# Patient Record
Sex: FEMALE | Race: BLACK OR AFRICAN AMERICAN | NOT HISPANIC OR LATINO | URBAN - METROPOLITAN AREA
[De-identification: names, ages, dates, MRNs, and addresses within clinical notes are randomized per-mention and may not be internally consistent; named-entity substitution may affect disease eponyms.]

---

## 2018-08-06 ENCOUNTER — TESTING ONLY (OUTPATIENT)
Dept: URBAN - METROPOLITAN AREA CLINIC 193 | Facility: CLINIC | Age: 78
End: 2018-08-06
Payer: COMMERCIAL

## 2018-08-06 PROCEDURE — 92083 EXTENDED VISUAL FIELD XM: CPT | Performed by: OPTOMETRIST

## 2018-08-09 ENCOUNTER — OFFICE VISIT (OUTPATIENT)
Dept: URBAN - METROPOLITAN AREA CLINIC 193 | Facility: CLINIC | Age: 78
End: 2018-08-09
Payer: COMMERCIAL

## 2018-08-09 PROCEDURE — 92133 CPTRZD OPH DX IMG PST SGM ON: CPT | Performed by: OPHTHALMOLOGY

## 2018-08-09 PROCEDURE — 92014 COMPRE OPH EXAM EST PT 1/>: CPT | Performed by: OPHTHALMOLOGY

## 2018-08-09 RX ORDER — BIMATOPROST 0.1 MG/ML
0.01 % SOLUTION/ DROPS OPHTHALMIC
Qty: 7.5 | Refills: 3 | Status: INACTIVE
Start: 2018-08-09 | End: 2018-11-27

## 2018-08-09 ASSESSMENT — INTRAOCULAR PRESSURE
OS: 14
OD: 14

## 2018-08-09 NOTE — IMPRESSION/PLAN
Impression: Diagnosis: Age-related nuclear cataract, bilateral. Code: H25.13.  Plan: Continue to monitor

## 2018-08-09 NOTE — IMPRESSION/PLAN
Impression: Diagnosis: Anatomical narrow angle, bilateral. Code: H40.033. early PAS.  S/P PI OU Plan: Continue to monitor

## 2018-08-09 NOTE — IMPRESSION/PLAN
Impression: Diagnosis: Primary open-angle glaucoma, bilateral, moderate stage. Code: Z33.4050. OU. S/P SLT OU (03/28/2013) (01/26/17) IOP OU controlled on current regimen. No changes needed. OCT stable compared to last. VF stable compared to last. Plan: Continue Lumigan QHS OU and Dorzolamide BID OU.

## 2018-11-29 ENCOUNTER — OFFICE VISIT (OUTPATIENT)
Dept: URBAN - METROPOLITAN AREA CLINIC 71 | Facility: CLINIC | Age: 78
End: 2018-11-29
Payer: COMMERCIAL

## 2018-11-29 DIAGNOSIS — H25.13 AGE-RELATED NUCLEAR CATARACT, BILATERAL: ICD-10-CM

## 2018-11-29 PROCEDURE — 99213 OFFICE O/P EST LOW 20 MIN: CPT | Performed by: OPHTHALMOLOGY

## 2018-11-29 RX ORDER — DORZOLAMIDE HYDROCHLORIDE AND TIMOLOL MALEATE 20; 5 MG/ML; MG/ML
SOLUTION/ DROPS OPHTHALMIC
Qty: 3 | Refills: 3 | Status: INACTIVE
Start: 2018-11-29 | End: 2019-03-21

## 2018-11-29 ASSESSMENT — INTRAOCULAR PRESSURE
OD: 15
OS: 15

## 2018-11-29 NOTE — IMPRESSION/PLAN
Impression: Diagnosis: Primary open-angle glaucoma, bilateral, moderate stage. Code: N50.9416. OU. S/P SLT OU (03/28/2013) (01/26/17) IOP OU controlled on current regimen. No changes needed. Plan: Continue Lumigan QHS OU and Dorzolamide BID OU.

## 2019-03-21 ENCOUNTER — OFFICE VISIT (OUTPATIENT)
Dept: URBAN - METROPOLITAN AREA CLINIC 71 | Facility: CLINIC | Age: 79
End: 2019-03-21
Payer: COMMERCIAL

## 2019-03-21 PROCEDURE — 99213 OFFICE O/P EST LOW 20 MIN: CPT | Performed by: OPHTHALMOLOGY

## 2019-03-21 RX ORDER — DORZOLAMIDE HYDROCHLORIDE AND TIMOLOL MALEATE 20; 5 MG/ML; MG/ML
SOLUTION/ DROPS OPHTHALMIC
Qty: 3 | Refills: 3 | Status: INACTIVE
Start: 2019-03-21 | End: 2019-04-04

## 2019-03-21 RX ORDER — BIMATOPROST 0.1 MG/ML
0.01 % SOLUTION/ DROPS OPHTHALMIC
Qty: 2.5 | Refills: 6 | Status: INACTIVE
Start: 2019-03-21 | End: 2019-08-12

## 2019-03-21 ASSESSMENT — INTRAOCULAR PRESSURE
OD: 14
OS: 14

## 2019-03-21 NOTE — IMPRESSION/PLAN
Impression: Diagnosis: Primary open-angle glaucoma, bilateral, moderate stage. Code: Q37.9700. OU. S/P SLT OU (03/28/2013) (01/26/17) IOP OU controlled on current regimen. No changes needed. Plan: Continue Lumigan QHS OU and Dorzolamide BID OU. Needs updated tests.

## 2019-07-17 ENCOUNTER — Encounter (OUTPATIENT)
Dept: URBAN - METROPOLITAN AREA CLINIC 71 | Facility: CLINIC | Age: 79
End: 2019-07-17
Payer: COMMERCIAL

## 2019-07-17 PROCEDURE — 92083 EXTENDED VISUAL FIELD XM: CPT | Performed by: OPHTHALMOLOGY

## 2019-07-31 ENCOUNTER — OFFICE VISIT (OUTPATIENT)
Dept: URBAN - METROPOLITAN AREA CLINIC 71 | Facility: CLINIC | Age: 79
End: 2019-07-31
Payer: MEDICARE

## 2019-07-31 PROCEDURE — 92014 COMPRE OPH EXAM EST PT 1/>: CPT | Performed by: OPHTHALMOLOGY

## 2019-07-31 PROCEDURE — 92133 CPTRZD OPH DX IMG PST SGM ON: CPT | Performed by: OPHTHALMOLOGY

## 2019-07-31 ASSESSMENT — INTRAOCULAR PRESSURE
OD: 15
OS: 15

## 2019-07-31 NOTE — IMPRESSION/PLAN
Impression: Diagnosis: Primary open-angle glaucoma, bilateral, moderate stage. Code: Q46.7905. OU. S/P SLT OU (03/28/2013) (01/26/17) IOP OU controlled on current regimen. No changes needed. OCT stable compared to last.  VF stable compared to last. Plan: Continue Lumigan QHS OU and Dorzolamide BID OU.

## 2019-10-31 ENCOUNTER — OFFICE VISIT (OUTPATIENT)
Dept: URBAN - METROPOLITAN AREA CLINIC 71 | Facility: CLINIC | Age: 79
End: 2019-10-31
Payer: MEDICARE

## 2019-10-31 PROCEDURE — 99213 OFFICE O/P EST LOW 20 MIN: CPT | Performed by: OPHTHALMOLOGY

## 2019-10-31 ASSESSMENT — INTRAOCULAR PRESSURE
OD: 16
OS: 15

## 2019-10-31 NOTE — IMPRESSION/PLAN
Impression: Diagnosis: Primary open-angle glaucoma, bilateral, moderate stage. Code: Z64.5814. OU. S/P SLT OU (03/28/2013) (01/26/17) IOP OU controlled on current regimen. No changes needed. Plan: Continue Lumigan QHS OU and Dorzolamide BID OU. Discussed with patient, will transfer care to Dr Carlo Muller or patient can travel to Dr Geronimo Fierro in [place].

## 2020-02-10 ENCOUNTER — OFFICE VISIT (OUTPATIENT)
Dept: URBAN - METROPOLITAN AREA CLINIC 71 | Facility: CLINIC | Age: 80
End: 2020-02-10
Payer: MEDICARE

## 2020-02-10 PROCEDURE — 99213 OFFICE O/P EST LOW 20 MIN: CPT | Performed by: OPTOMETRIST

## 2020-02-10 ASSESSMENT — INTRAOCULAR PRESSURE
OD: 16
OS: 14

## 2020-02-10 NOTE — IMPRESSION/PLAN
Impression: Diagnosis: Primary open-angle glaucoma, bilateral, moderate stage. Code: F66.7418. OU. S/P SLT OU (03/28/2013) (01/26/17) IOP OU controlled on current regimen. No changes needed. Plan: Continue Lumigan QHS OU and Dorzolamide BID OU. Dowd: will transfer care to Dr Laura Manjarrez or patient can travel to Dr Katarzyna Butler in [place] if glc not controlled. testing due 7/2020.

## 2020-05-11 ENCOUNTER — OFFICE VISIT (OUTPATIENT)
Dept: URBAN - METROPOLITAN AREA CLINIC 71 | Facility: CLINIC | Age: 80
End: 2020-05-11
Payer: MEDICARE

## 2020-05-11 PROCEDURE — 99213 OFFICE O/P EST LOW 20 MIN: CPT | Performed by: OPTOMETRIST

## 2020-05-11 ASSESSMENT — INTRAOCULAR PRESSURE
OD: 15
OS: 13

## 2020-05-11 NOTE — IMPRESSION/PLAN
Impression: Diagnosis: Primary open-angle glaucoma, bilateral, moderate stage. Code: Z56.8796. OU. S/P SLT OU (03/28/2013) (01/26/17) IOP OU controlled on current regimen. No changes Plan: Continue Lumigan QHS OU and Dorzolamide BID OU. Dowd: will transfer care to Dr Brenda Herrmann or patient can travel to Dr Misti Gonzalez in [place] if glc not controlled. testing due 7/2020.

## 2020-05-11 NOTE — IMPRESSION/PLAN
Impression: Diagnosis: Age-related nuclear cataract, bilateral. Code: H25.13. happy with vision as is Plan: Continue to monitor

## 2020-09-17 ENCOUNTER — OFFICE VISIT (OUTPATIENT)
Dept: URBAN - METROPOLITAN AREA CLINIC 71 | Facility: CLINIC | Age: 80
End: 2020-09-17
Payer: MEDICARE

## 2020-09-17 DIAGNOSIS — H25.813 COMBINED FORMS OF AGE-RELATED CATARACT, BILATERAL: ICD-10-CM

## 2020-09-17 PROCEDURE — 92083 EXTENDED VISUAL FIELD XM: CPT | Performed by: OPTOMETRIST

## 2020-09-17 PROCEDURE — 92133 CPTRZD OPH DX IMG PST SGM ON: CPT | Performed by: OPTOMETRIST

## 2020-09-17 PROCEDURE — 92014 COMPRE OPH EXAM EST PT 1/>: CPT | Performed by: OPTOMETRIST

## 2020-09-17 ASSESSMENT — KERATOMETRY
OS: 44.75
OD: 44.88

## 2020-09-17 ASSESSMENT — INTRAOCULAR PRESSURE
OS: 15
OD: 15

## 2020-09-17 NOTE — IMPRESSION/PLAN
Impression: Combined forms of age-related cataract, bilateral: H25.813. vision stable OU. Happy with vision OU. Plan: Cataracts affecting vision some, but no surgery is currently recommended. The patient will monitor vision changes and contact us with any decrease in vision. Continue to monitor.

## 2020-09-17 NOTE — IMPRESSION/PLAN
Impression: Diagnosis: Primary open-angle glaucoma, bilateral, moderate stage. Code: N48.3279. OU. S/P SLT OU (03/28/2013) (01/26/17). IOP OU controlled on current regimen. OCT 9/17/20: mildly worse OU. VF 9/17/20: general reduction and glc maybe worse OD, fluctuating OS. Plan: Continue Lumigan QHS OU and Dorzolamide BID OU. Due to seemingly controlled IOPs but OCT/VF showing possible progression OU, refer to Dr. Nelson Ruiz for glc consult next available.

## 2020-12-22 ENCOUNTER — OFFICE VISIT (OUTPATIENT)
Dept: URBAN - METROPOLITAN AREA CLINIC 75 | Facility: CLINIC | Age: 80
End: 2020-12-22
Payer: MEDICARE

## 2020-12-22 DIAGNOSIS — H40.1132 PRIMARY OPEN-ANGLE GLAUCOMA, BILATERAL, MODERATE STAGE: Primary | ICD-10-CM

## 2020-12-22 PROCEDURE — 99213 OFFICE O/P EST LOW 20 MIN: CPT | Performed by: OPTOMETRIST

## 2020-12-22 ASSESSMENT — INTRAOCULAR PRESSURE
OD: 13
OS: 13

## 2020-12-22 NOTE — IMPRESSION/PLAN
Impression: Diagnosis: Primary open-angle glaucoma, bilateral, moderate stage. Code: Y58.8537. OU.  IOP OU controlled on current regimen. Plan: Continue Lumigan QHS OU and Dorzolamide BID OU. Due to seemingly controlled IOPs but OCT/VF showing possible progression OU, refer to ophthalmologist for SLT OU. Pt had good outcome from past SLT.

## 2020-12-22 NOTE — IMPRESSION/PLAN
Impression: Anatomical narrow angle, bilateral: H40.033. Plan: Discussed s/s of angle closure. Observe.

## 2021-03-09 ENCOUNTER — PRE-OPERATIVE VISIT (OUTPATIENT)
Dept: URBAN - METROPOLITAN AREA CLINIC 71 | Facility: CLINIC | Age: 81
End: 2021-03-09
Payer: MEDICARE

## 2021-03-09 DIAGNOSIS — H40.1111 PRIMARY OPEN-ANGLE GLAUCOMA, RIGHT EYE, MILD STAGE: Primary | ICD-10-CM

## 2021-03-09 DIAGNOSIS — H40.1121 PRIMARY OPEN-ANGLE GLAUCOMA, LEFT EYE, MILD STAGE: ICD-10-CM

## 2021-03-09 PROCEDURE — 99213 OFFICE O/P EST LOW 20 MIN: CPT | Performed by: OPHTHALMOLOGY

## 2021-03-09 NOTE — IMPRESSION/PLAN
Impression: Primary open-angle glaucoma, left eye, mild stage: H40.1121. Plan: OU: Discussed diagnosis in detail with patient. Discussed treatment options with patient. Advised patient of condition. Surgical treatment is necessary to improve pressures. Patient elects to have surgery. Surgical risks and benefits were discussed, explained and understood by patient. Discussed signs and symptoms of retinal detachment. Discussed signs and symptoms of PVD/floaters. Schedule SLT - OD. Schedule SLT - OS. Pre op instructions given and understood. Post op instructions given and understood. Continue using current medication(s). PROCEED WITH SLT OU, OD FIRST, OS SECOND, CS VIDEO AND FORMS COMPLETE.

## 2021-03-22 ENCOUNTER — SURGERY (OUTPATIENT)
Dept: URBAN - METROPOLITAN AREA SURGERY 44 | Facility: SURGERY | Age: 81
End: 2021-03-22
Payer: MEDICARE

## 2021-03-22 ENCOUNTER — SURGERY (OUTPATIENT)
Dept: URBAN - METROPOLITAN AREA SURGERY 45 | Facility: SURGERY | Age: 81
End: 2021-03-22
Payer: MEDICARE

## 2021-03-22 PROCEDURE — 65855 TRABECULOPLASTY LASER SURG: CPT | Performed by: OPHTHALMOLOGY

## 2021-03-29 ENCOUNTER — SURGERY (OUTPATIENT)
Dept: URBAN - METROPOLITAN AREA SURGERY 45 | Facility: SURGERY | Age: 81
End: 2021-03-29
Payer: MEDICARE

## 2021-03-29 ENCOUNTER — SURGERY (OUTPATIENT)
Dept: URBAN - METROPOLITAN AREA SURGERY 44 | Facility: SURGERY | Age: 81
End: 2021-03-29
Payer: MEDICARE

## 2021-03-29 PROCEDURE — 65855 TRABECULOPLASTY LASER SURG: CPT | Performed by: OPHTHALMOLOGY

## 2021-04-20 ENCOUNTER — OFFICE VISIT (OUTPATIENT)
Dept: URBAN - METROPOLITAN AREA CLINIC 71 | Facility: CLINIC | Age: 81
End: 2021-04-20
Payer: MEDICARE

## 2021-04-20 PROCEDURE — 99213 OFFICE O/P EST LOW 20 MIN: CPT | Performed by: OPTOMETRIST

## 2021-04-20 ASSESSMENT — INTRAOCULAR PRESSURE
OD: 12
OS: 12

## 2021-04-20 NOTE — IMPRESSION/PLAN
Impression: Anatomical narrow angle, bilateral: H40.033. Quiet OU Plan: Discussed s/s of angle closure.  Continue to observe

## 2021-04-20 NOTE — IMPRESSION/PLAN
Impression: Primary open-angle glaucoma, left eye, mild stage: H40.1121. Plan: Discussed. IOP okay OU. Continue Lumigan QHS OU, Continue Dorzolamide-Timolol BID OU. Per pt, she will continue care for POAG with Dr. Noni Andrade. Continue to monitor.

## 2021-07-20 ENCOUNTER — OFFICE VISIT (OUTPATIENT)
Dept: URBAN - METROPOLITAN AREA CLINIC 71 | Facility: CLINIC | Age: 81
End: 2021-07-20
Payer: MEDICARE

## 2021-07-20 PROCEDURE — 99212 OFFICE O/P EST SF 10 MIN: CPT | Performed by: OPHTHALMOLOGY

## 2021-07-20 ASSESSMENT — INTRAOCULAR PRESSURE
OD: 14
OS: 19

## 2021-07-20 NOTE — IMPRESSION/PLAN
Impression: Anatomical narrow angle, bilateral: H40.033. Open PI OU Plan: Discussed.  Contact office with any s/s of angle closure

## 2021-07-20 NOTE — IMPRESSION/PLAN
Impression: Primary open-angle glaucoma, left eye, mild stage: H40.1121. Stable. IOP OS slightly elevated, if continued, may consider alternative treatment Plan: Discussed. No change to treatment at this time Continue Lumigan QHS OU, Continue Dorzolamide-Timolol BID OU. Contact office if any changes in vision.  Will continue to monitor every 6 months

## 2021-08-03 ENCOUNTER — OFFICE VISIT (OUTPATIENT)
Dept: URBAN - METROPOLITAN AREA CLINIC 71 | Facility: CLINIC | Age: 81
End: 2021-08-03
Payer: COMMERCIAL

## 2021-08-03 DIAGNOSIS — H52.4 PRESBYOPIA: Primary | ICD-10-CM

## 2021-08-03 PROCEDURE — 92014 COMPRE OPH EXAM EST PT 1/>: CPT | Performed by: OPTOMETRIST

## 2021-08-03 ASSESSMENT — VISUAL ACUITY
OS: 20/20
OD: 20/20

## 2021-08-03 ASSESSMENT — INTRAOCULAR PRESSURE
OS: 10
OD: 10

## 2021-08-03 NOTE — IMPRESSION/PLAN
Impression: Presbyopia: H52.4. Plan: Presbyopia is the inability to focus on objects (ie: accommodate) due to the loss of flexibility of your natural lens. Presbyopia occurs with age. Reading glasses, bifocals, trifocals or contacts can be helpful. Contact the office if difficulty focusing persists despite corrective eye wear. New glasses RX given today for DVO and NVO. Will continue to follow annually for vision exams.

## 2022-01-20 ENCOUNTER — OFFICE VISIT (OUTPATIENT)
Dept: URBAN - METROPOLITAN AREA CLINIC 71 | Facility: CLINIC | Age: 82
End: 2022-01-20
Payer: MEDICARE

## 2022-01-20 DIAGNOSIS — H40.033 ANATOMICAL NARROW ANGLE, BILATERAL: ICD-10-CM

## 2022-01-20 DIAGNOSIS — H43.822 VITREOMACULAR TRACTION OF LEFT EYE: ICD-10-CM

## 2022-01-20 PROCEDURE — 92133 CPTRZD OPH DX IMG PST SGM ON: CPT | Performed by: OPHTHALMOLOGY

## 2022-01-20 PROCEDURE — 92014 COMPRE OPH EXAM EST PT 1/>: CPT | Performed by: OPHTHALMOLOGY

## 2022-01-20 ASSESSMENT — INTRAOCULAR PRESSURE
OS: 14
OD: 13

## 2022-01-20 NOTE — IMPRESSION/PLAN
Impression: Primary open-angle glaucoma, bilateral, moderate stage: D20.2974. Plan: Discussed. Continue Lumigan QHS OU, Continue Dorzolamide-Timolol BID OU. Contact office if any changes in vision.  Will continue to monitor every 6 months

## 2022-01-20 NOTE — IMPRESSION/PLAN
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 Plan: Discussed in detail with patient. Advised patient that on a rare occasion these can cause a macular hole. Pt advised of symptoms she may experience. Pt advised to contact office if any new distortion or missing areas in vision. Recommend monitor using [employer] grid (grid and instructions given).  Will continue to monitor

## 2022-01-20 NOTE — IMPRESSION/PLAN
Impression: Anatomical narrow angle, bilateral: H40.033. Patent PI OU Plan: Discussed. Contact office with any s/s of angle closure.  Continue to monitor

## 2022-07-20 ENCOUNTER — OFFICE VISIT (OUTPATIENT)
Dept: URBAN - METROPOLITAN AREA CLINIC 71 | Facility: CLINIC | Age: 82
End: 2022-07-20
Payer: MEDICARE

## 2022-07-20 DIAGNOSIS — H25.13 AGE-RELATED NUCLEAR CATARACT, BILATERAL: ICD-10-CM

## 2022-07-20 DIAGNOSIS — H40.033 ANATOMICAL NARROW ANGLE, BILATERAL: Primary | ICD-10-CM

## 2022-07-20 PROCEDURE — 99213 OFFICE O/P EST LOW 20 MIN: CPT | Performed by: OPHTHALMOLOGY

## 2022-07-20 PROCEDURE — 92133 CPTRZD OPH DX IMG PST SGM ON: CPT | Performed by: OPHTHALMOLOGY

## 2022-07-20 PROCEDURE — 92083 EXTENDED VISUAL FIELD XM: CPT | Performed by: OPHTHALMOLOGY

## 2022-07-20 ASSESSMENT — INTRAOCULAR PRESSURE
OS: 19
OD: 16
OS: 18
OD: 17

## 2022-07-20 NOTE — IMPRESSION/PLAN
Impression: Anatomical narrow angle, bilateral: H40.033. S/P PI OU. VF and OCT ordered today and reviewed. VF shows arcuate OU, but stable. RNFL is stable. IOP is borderline today OU. Patient is tolerating medication well. Plan: Continue Lumigan QHS OU and dorzolamide-Timolol BID OU. Patient to return in 2 months for an IOP check to see if the IOP is remaining elevated.

## 2022-09-20 ENCOUNTER — OFFICE VISIT (OUTPATIENT)
Dept: URBAN - METROPOLITAN AREA CLINIC 71 | Facility: CLINIC | Age: 82
End: 2022-09-20
Payer: MEDICARE

## 2022-09-20 DIAGNOSIS — H25.13 AGE-RELATED NUCLEAR CATARACT, BILATERAL: ICD-10-CM

## 2022-09-20 DIAGNOSIS — H40.033 ANATOMICAL NARROW ANGLE, BILATERAL: Primary | ICD-10-CM

## 2022-09-20 PROCEDURE — 99212 OFFICE O/P EST SF 10 MIN: CPT | Performed by: OPHTHALMOLOGY

## 2022-09-20 ASSESSMENT — INTRAOCULAR PRESSURE
OD: 16
OS: 15
OS: 16
OD: 12

## 2022-09-20 NOTE — IMPRESSION/PLAN
Impression: Anatomical narrow angle, bilateral: H40.033. S/P PI OU. IOP has improved and is within a normal range. Patient is compliant with drops. Plan: Continue Lumigan QHS OU and dorzolamide-timolol BID OU. Return in 4 months for an IOP check with [facility] imaging.

## 2023-01-24 ENCOUNTER — OFFICE VISIT (OUTPATIENT)
Dept: URBAN - METROPOLITAN AREA CLINIC 71 | Facility: CLINIC | Age: 83
End: 2023-01-24
Payer: MEDICARE

## 2023-01-24 DIAGNOSIS — H25.13 AGE-RELATED NUCLEAR CATARACT, BILATERAL: ICD-10-CM

## 2023-01-24 DIAGNOSIS — H40.033 ANATOMICAL NARROW ANGLE, BILATERAL: Primary | ICD-10-CM

## 2023-01-24 PROCEDURE — 99212 OFFICE O/P EST SF 10 MIN: CPT | Performed by: OPHTHALMOLOGY

## 2023-01-24 ASSESSMENT — INTRAOCULAR PRESSURE
OS: 12
OS: 17
OD: 17
OD: 11

## 2023-01-24 NOTE — IMPRESSION/PLAN
Impression: Anatomical narrow angle, bilateral: H40.033. S/P PI OU. Target IOP is under 20. [facility] ordered today and reviewed. IOP is stable with the use of drops. Plan: Continue Lumigan QHS OU and dorzolamide-timolol BID OU. Return in 4 months for dilation and OCT testing.

## 2023-05-23 ENCOUNTER — OFFICE VISIT (OUTPATIENT)
Dept: URBAN - METROPOLITAN AREA CLINIC 71 | Facility: CLINIC | Age: 83
End: 2023-05-23
Payer: MEDICARE

## 2023-05-23 DIAGNOSIS — H40.033 ANATOMICAL NARROW ANGLE, BILATERAL: Primary | ICD-10-CM

## 2023-05-23 DIAGNOSIS — H25.13 AGE-RELATED NUCLEAR CATARACT, BILATERAL: ICD-10-CM

## 2023-05-23 PROCEDURE — 92133 CPTRZD OPH DX IMG PST SGM ON: CPT | Performed by: OPHTHALMOLOGY

## 2023-05-23 PROCEDURE — 99214 OFFICE O/P EST MOD 30 MIN: CPT | Performed by: OPHTHALMOLOGY

## 2023-05-23 ASSESSMENT — INTRAOCULAR PRESSURE
OS: 14
OD: 15

## 2023-05-23 NOTE — IMPRESSION/PLAN
Impression: Anatomical narrow angle, bilateral: H40.033. S/P PI OU. Target IOP is under 20. OCT ordered today and reviewed. RNFL is remaining stable. no progression noted. IOP is stable with the use of drops. Plan: Continue Lumigan QHS OU and dorzolamide-timolol BID OU.  Return in 4 months for VF and IOP check

## 2023-09-26 ENCOUNTER — OFFICE VISIT (OUTPATIENT)
Dept: URBAN - METROPOLITAN AREA CLINIC 71 | Facility: CLINIC | Age: 83
End: 2023-09-26
Payer: MEDICARE

## 2023-09-26 DIAGNOSIS — H40.033 ANATOMICAL NARROW ANGLE, BILATERAL: Primary | ICD-10-CM

## 2023-09-26 DIAGNOSIS — H25.13 AGE-RELATED NUCLEAR CATARACT, BILATERAL: ICD-10-CM

## 2023-09-26 PROCEDURE — 92083 EXTENDED VISUAL FIELD XM: CPT | Performed by: OPHTHALMOLOGY

## 2023-09-26 PROCEDURE — 99213 OFFICE O/P EST LOW 20 MIN: CPT | Performed by: OPHTHALMOLOGY

## 2023-09-26 ASSESSMENT — INTRAOCULAR PRESSURE
OD: 17
OS: 17
OS: 10
OD: 10

## 2024-01-26 ENCOUNTER — OFFICE VISIT (OUTPATIENT)
Dept: URBAN - METROPOLITAN AREA CLINIC 71 | Facility: CLINIC | Age: 84
End: 2024-01-26
Payer: MEDICARE

## 2024-01-26 DIAGNOSIS — H40.033 ANATOMICAL NARROW ANGLE, BILATERAL: Primary | ICD-10-CM

## 2024-01-26 DIAGNOSIS — H25.13 AGE-RELATED NUCLEAR CATARACT, BILATERAL: ICD-10-CM

## 2024-01-26 PROCEDURE — 92133 CPTRZD OPH DX IMG PST SGM ON: CPT | Performed by: OPHTHALMOLOGY

## 2024-01-26 PROCEDURE — 99213 OFFICE O/P EST LOW 20 MIN: CPT | Performed by: OPHTHALMOLOGY

## 2024-01-26 ASSESSMENT — INTRAOCULAR PRESSURE
OS: 14
OD: 11

## 2024-06-03 ENCOUNTER — OFFICE VISIT (OUTPATIENT)
Dept: URBAN - METROPOLITAN AREA CLINIC 71 | Facility: CLINIC | Age: 84
End: 2024-06-03
Payer: MEDICARE

## 2024-06-03 DIAGNOSIS — H25.13 AGE-RELATED NUCLEAR CATARACT, BILATERAL: ICD-10-CM

## 2024-06-03 DIAGNOSIS — H40.033 ANATOMICAL NARROW ANGLE, BILATERAL: Primary | ICD-10-CM

## 2024-06-03 PROCEDURE — 99214 OFFICE O/P EST MOD 30 MIN: CPT | Performed by: OPHTHALMOLOGY

## 2024-06-03 ASSESSMENT — INTRAOCULAR PRESSURE
OD: 12
OD: 18
OS: 19
OS: 14

## 2024-08-19 NOTE — IMPRESSION/PLAN
Impression: Age-related nuclear cataract, bilateral: H25.13. Plan: No treatment currently recommended. The patient will monitor vision changes and contact us with any decrease in vision. 0

## 2024-10-01 ENCOUNTER — OFFICE VISIT (OUTPATIENT)
Dept: URBAN - METROPOLITAN AREA CLINIC 71 | Facility: CLINIC | Age: 84
End: 2024-10-01
Payer: MEDICARE

## 2024-10-01 DIAGNOSIS — H25.13 AGE-RELATED NUCLEAR CATARACT, BILATERAL: ICD-10-CM

## 2024-10-01 DIAGNOSIS — H40.033 ANATOMICAL NARROW ANGLE, BILATERAL: Primary | ICD-10-CM

## 2024-10-01 PROCEDURE — 99213 OFFICE O/P EST LOW 20 MIN: CPT | Performed by: OPHTHALMOLOGY

## 2024-10-01 PROCEDURE — 92083 EXTENDED VISUAL FIELD XM: CPT | Performed by: OPHTHALMOLOGY

## 2024-10-01 ASSESSMENT — INTRAOCULAR PRESSURE
OS: 19
OD: 11
OD: 19
OS: 12

## 2025-02-05 ENCOUNTER — OFFICE VISIT (OUTPATIENT)
Dept: URBAN - METROPOLITAN AREA CLINIC 71 | Facility: CLINIC | Age: 85
End: 2025-02-05
Payer: MEDICARE

## 2025-02-05 DIAGNOSIS — H40.033 ANATOMICAL NARROW ANGLE, BILATERAL: Primary | ICD-10-CM

## 2025-02-05 PROCEDURE — 99203 OFFICE O/P NEW LOW 30 MIN: CPT | Performed by: OPTOMETRIST

## 2025-02-05 ASSESSMENT — INTRAOCULAR PRESSURE
OD: 12
OD: 14
OS: 15
OS: 12